# Patient Record
Sex: MALE | Race: WHITE | NOT HISPANIC OR LATINO | Employment: OTHER | URBAN - NONMETROPOLITAN AREA
[De-identification: names, ages, dates, MRNs, and addresses within clinical notes are randomized per-mention and may not be internally consistent; named-entity substitution may affect disease eponyms.]

---

## 2024-09-27 ENCOUNTER — APPOINTMENT (OUTPATIENT)
Dept: PRIMARY CARE | Facility: CLINIC | Age: 79
End: 2024-09-27
Payer: MEDICARE

## 2024-11-05 ENCOUNTER — CLINICAL SUPPORT (OUTPATIENT)
Dept: AUDIOLOGY | Facility: CLINIC | Age: 79
End: 2024-11-05
Payer: MEDICARE

## 2024-11-05 NOTE — PROGRESS NOTES
AUDIOLOGY ADULT HEARING AID REPAIR    Name:  Freddy Springer  :  1945  Age:  78 y.o.  Date of Service:  2024    Reason for visit: Mr. Springer is seen in the clinic today by audiology assistant for a hearing aid repair appointment. Patient complains that his hearing aids are no longer connected to his iPhone. He also feels that his right hearing aid is not producing any sound.     HEARING AID CHECK  Hearing Aid Initial Listening Check:  Right: weak   Left: unremarkable     Hearing Aid Physical Examination:  Right: occluded wax guard, dirty dome, dirty microphones,   Left: fairly clean wax guard, dirty dome, dirty microphones,     Hearing Aid Clean & Check:  Hearing aids were sanitized and checked. Receivers were cleaned and domes were replaced. Microphones were brushed and vacuumed.    Hearing Aid Final Listening Check:  Right: improvement noted   Left: improvement noted    Hearing Aid Adjustment & Connectivity & Specific Counseling:  Unpaired and repaired to cell phone.  Counseled on running software update on iPhone as lack of update may be disrupting blue tooth connectivity.     IMPRESSIONS  Hearing aids were returned to the patient and he expressed satisfaction.      RECOMMENDATIONS  - Continued hearing aid use.  - Follow up with audiologist regarding continuing care.  - Recommend visit with audiologist as soon as possible for hearing test and hearing aid adjustment.    PATIENT EDUCATION  Discussed results, impressions and recommendations with the patient. Questions were addressed and the patient was encouraged to contact our office should concerns arise.    CHARGE CAPTURE  $25; paper encounter form utilized and submitted processed.    Simran Wesley  Licensed Audiology Assistant

## 2025-01-20 ENCOUNTER — HOSPITAL ENCOUNTER (EMERGENCY)
Facility: HOSPITAL | Age: 80
Discharge: HOME | End: 2025-01-20
Attending: STUDENT IN AN ORGANIZED HEALTH CARE EDUCATION/TRAINING PROGRAM
Payer: MEDICARE

## 2025-01-20 VITALS
RESPIRATION RATE: 16 BRPM | OXYGEN SATURATION: 99 % | HEART RATE: 100 BPM | WEIGHT: 175 LBS | BODY MASS INDEX: 28.12 KG/M2 | SYSTOLIC BLOOD PRESSURE: 145 MMHG | DIASTOLIC BLOOD PRESSURE: 99 MMHG | TEMPERATURE: 98.1 F | HEIGHT: 66 IN

## 2025-01-20 DIAGNOSIS — B35.4 TINEA CORPORIS: Primary | ICD-10-CM

## 2025-01-20 PROCEDURE — 99281 EMR DPT VST MAYX REQ PHY/QHP: CPT | Performed by: STUDENT IN AN ORGANIZED HEALTH CARE EDUCATION/TRAINING PROGRAM

## 2025-01-20 PROCEDURE — 99283 EMERGENCY DEPT VISIT LOW MDM: CPT

## 2025-01-20 RX ORDER — ITRACONAZOLE 100 MG/1
200 CAPSULE ORAL DAILY
Qty: 14 CAPSULE | Refills: 0 | Status: SHIPPED | OUTPATIENT
Start: 2025-01-20 | End: 2025-01-27

## 2025-01-20 ASSESSMENT — COLUMBIA-SUICIDE SEVERITY RATING SCALE - C-SSRS
2. HAVE YOU ACTUALLY HAD ANY THOUGHTS OF KILLING YOURSELF?: NO
1. IN THE PAST MONTH, HAVE YOU WISHED YOU WERE DEAD OR WISHED YOU COULD GO TO SLEEP AND NOT WAKE UP?: NO
6. HAVE YOU EVER DONE ANYTHING, STARTED TO DO ANYTHING, OR PREPARED TO DO ANYTHING TO END YOUR LIFE?: NO

## 2025-01-20 NOTE — ED PROVIDER NOTES
HPI   Chief Complaint   Patient presents with    Wound Check       Patient is a 79-year-old male presents emergency department for evaluation of rash to groin and inguinal area as well as left armpit.  Patient states that 2 weeks ago he developed a rash in his groin and his left armpit.  He states it was very itchy and he went to the pharmacy and got topical medication for what he thought was ringworm.  He states after using the topical medication to his groin and inguinal area lotrimin.  States it was significantly improving, but over the last week he started noticing a rash forming to his left armpit as well.  He states it is very bothersome to him.  He states that he does have a history of prostate cancer and is on chronic chemotherapy for this.  He denies any fevers, chills, lightheadedness, dizziness, shortness of breath, nausea, vomiting, or other symptoms at this time.      History provided by:  Patient   used: No            Patient History   No past medical history on file.  No past surgical history on file.  No family history on file.  Social History     Tobacco Use    Smoking status: Not on file    Smokeless tobacco: Not on file   Substance Use Topics    Alcohol use: Not on file    Drug use: Not on file       Physical Exam   ED Triage Vitals [01/20/25 1045]   Temperature Heart Rate Respirations BP   36.7 °C (98.1 °F) 100 16 (!) 145/99      Pulse Ox Temp Source Heart Rate Source Patient Position   99 % Temporal Monitor --      BP Location FiO2 (%)     -- --       Physical Exam  Constitutional:       Appearance: Normal appearance.   Cardiovascular:      Rate and Rhythm: Normal rate and regular rhythm.   Pulmonary:      Effort: Pulmonary effort is normal.      Breath sounds: Normal breath sounds.   Abdominal:      General: Abdomen is flat.      Palpations: Abdomen is soft.      Tenderness: There is no abdominal tenderness.   Musculoskeletal:         General: Normal range of motion.    Skin:     General: Skin is warm and dry.      Comments: Widespread annular erythematous rash with some with central clearing on various lesions with some bullous to bilateral inguinal and groin area.  Similar rash to left armpit and left lateral chest wall. Widespread excoriation to inguinal, groin and left armpit.   Neurological:      General: No focal deficit present.      Mental Status: He is alert and oriented to person, place, and time.           ED Course & MDM   Diagnoses as of 01/20/25 1205   Tinea corporis                 No data recorded     [place] Coma Scale Score: 15 (01/20/25 1050 : Alicja Schmidt, RN)                           Medical Decision Making  Patient is a 79-year-old male presents emergency department for evaluation of rash to inguinal and left armpit area.    Lab work and scans not warranted at today's visit.      Medications not given at today's visit    I saw this patient in conjunction with Dr. Huber.  Patient has widespread rash consistent with likely tinea corporis with extensive nature and bullous areas to left armpit and groin and inguinal area.  Patient notes some improvement with Lotrimin and patient was educated to continue this topical clotrimazole to help with symptoms.  He will be started on course of oral itraconazole for more extensive disease and educated to follow-up closely with dermatology outpatient to ensure clearing.  Patient otherwise well-appearing without any significant complaints and nontoxic-appearing.  Emergent pathologies were considered for this patient, although I have low suspicion for anything acutely emergent given patient's clinical presentation, history, physical exam, stable vital signs.  Discharging patient home is reasonable plan of care for outpatient management.    Patient was counseled on clinical impression, expectations, and plan.  Patient was educated to follow-up with PCP in the following 1-2 days.  All questions from patient were  answered. They elicited understanding and were agreeable to course of treatment.  Patient was discharged in stable condition and given strict return precautions.    Prescriptions given on discharge:  PO itraconazole    ** Disclaimer:  Parts of this document were written utilizing a voice to text dictation software.  Note may contain minor transcription or typographical errors that were inadvertently transcribed by the computer software.        Procedure  Procedures     Isi De La Torre PA-C  01/20/25 1203

## 2025-01-20 NOTE — DISCHARGE INSTRUCTIONS
Please follow-up closely with your primary care provider in the following week.  Follow-up closely with dermatology as soon as possible, early within the next week.  Continue topical Lotrimin and take oral medication as prescribed.

## 2025-02-04 PROCEDURE — 87075 CULTR BACTERIA EXCEPT BLOOD: CPT

## 2025-02-04 PROCEDURE — 87075 CULTR BACTERIA EXCEPT BLOOD: CPT | Mod: OUT | Performed by: DERMATOLOGY

## 2025-02-04 PROCEDURE — 87070 CULTURE OTHR SPECIMN AEROBIC: CPT

## 2025-02-04 PROCEDURE — 87077 CULTURE AEROBIC IDENTIFY: CPT

## 2025-02-04 PROCEDURE — 87186 SC STD MICRODIL/AGAR DIL: CPT

## 2025-02-04 PROCEDURE — 87205 SMEAR GRAM STAIN: CPT

## 2025-02-05 ENCOUNTER — LAB REQUISITION (OUTPATIENT)
Dept: LAB | Facility: HOSPITAL | Age: 80
End: 2025-02-05
Payer: MEDICARE

## 2025-02-05 DIAGNOSIS — L30.9 DERMATITIS, UNSPECIFIED: ICD-10-CM

## 2025-02-07 LAB
BACTERIA SPEC CULT: ABNORMAL
BACTERIA SPEC CULT: ABNORMAL
GRAM STN SPEC: ABNORMAL
GRAM STN SPEC: ABNORMAL